# Patient Record
Sex: FEMALE | Race: WHITE | NOT HISPANIC OR LATINO | Employment: OTHER | URBAN - METROPOLITAN AREA
[De-identification: names, ages, dates, MRNs, and addresses within clinical notes are randomized per-mention and may not be internally consistent; named-entity substitution may affect disease eponyms.]

---

## 2017-01-03 ENCOUNTER — APPOINTMENT (OUTPATIENT)
Dept: RADIOLOGY | Facility: MEDICAL CENTER | Age: 57
End: 2017-01-03
Attending: PHYSICIAN ASSISTANT
Payer: MEDICARE

## 2017-01-17 ENCOUNTER — OFFICE VISIT (OUTPATIENT)
Dept: MEDICAL GROUP | Facility: MEDICAL CENTER | Age: 57
End: 2017-01-17
Payer: COMMERCIAL

## 2017-01-17 VITALS
BODY MASS INDEX: 26.5 KG/M2 | WEIGHT: 135 LBS | SYSTOLIC BLOOD PRESSURE: 130 MMHG | RESPIRATION RATE: 16 BRPM | TEMPERATURE: 97.2 F | HEART RATE: 86 BPM | HEIGHT: 60 IN | DIASTOLIC BLOOD PRESSURE: 84 MMHG | OXYGEN SATURATION: 94 %

## 2017-01-17 DIAGNOSIS — E11.29 TYPE 2 DIABETES MELLITUS WITH MICROALBUMINURIA, WITHOUT LONG-TERM CURRENT USE OF INSULIN (HCC): ICD-10-CM

## 2017-01-17 DIAGNOSIS — F41.9 ANXIETY AND DEPRESSION: ICD-10-CM

## 2017-01-17 DIAGNOSIS — E78.5 HYPERLIPIDEMIA, UNSPECIFIED HYPERLIPIDEMIA TYPE: ICD-10-CM

## 2017-01-17 DIAGNOSIS — G89.29 OTHER CHRONIC PAIN: ICD-10-CM

## 2017-01-17 DIAGNOSIS — R80.9 MICROALBUMINURIA: ICD-10-CM

## 2017-01-17 DIAGNOSIS — I10 ESSENTIAL HYPERTENSION: ICD-10-CM

## 2017-01-17 DIAGNOSIS — R80.9 TYPE 2 DIABETES MELLITUS WITH MICROALBUMINURIA, WITHOUT LONG-TERM CURRENT USE OF INSULIN (HCC): ICD-10-CM

## 2017-01-17 DIAGNOSIS — F32.A ANXIETY AND DEPRESSION: ICD-10-CM

## 2017-01-17 DIAGNOSIS — Z72.0 TOBACCO USE: ICD-10-CM

## 2017-01-17 PROCEDURE — 99214 OFFICE O/P EST MOD 30 MIN: CPT | Performed by: PHYSICIAN ASSISTANT

## 2017-01-17 NOTE — ASSESSMENT & PLAN NOTE
Chronic. Stable. Currently taking Celexa 40 mg daily basis. Currently homicidal or suicidal ideations. Denies auditory, visual or tactile hallucinations.

## 2017-01-17 NOTE — ASSESSMENT & PLAN NOTE
Chronic in nature. Currently taking Lipitor 40 mg daily basis. Car seats no abdominal pain. Denies any muscle aches, myalgias. Patient states no change in stool color, consistency.

## 2017-01-17 NOTE — ASSESSMENT & PLAN NOTE
Chronic in nature. Currently taking metformin 500 mg twice a day. Patient compliantly taking medications. Checking sugars approximately 3 times per week. Patient states sugars run approximately 110-112 when checked. Review of medical records shows labs out of date, ordered accordingly today. Patient does mention that diet, exercise could be better.

## 2017-01-17 NOTE — ASSESSMENT & PLAN NOTE
Currently being monitored by our office. Patient is aware that if levels are increasing in showing worsening state we will refer to nephrology

## 2017-01-17 NOTE — ASSESSMENT & PLAN NOTE
Chronic. Stable. Currently taking losartan and carvedilol. Was following up with cardiology. Currently asymptomatic and states no chest pain, shortness of breath, jaw claudication, diaphoresis, nausea, vomiting

## 2017-01-17 NOTE — PROGRESS NOTES
Chief Complaint   Patient presents with   • Follow-Up       HISTORY OF PRESENT ILLNESS: Patient is a 57 y.o. female established patient who presents today to follow up on a number chronic conditions    Type 2 diabetes mellitus with microalbuminuria, without long-term current use of insulin (CMS-[facility])  Chronic in nature. Currently taking metformin 500 mg twice a day. Patient compliantly taking medications. Checking sugars approximately 3 times per week. Patient states sugars run approximately 110-112 when checked. Review of medical records shows labs out of date, ordered accordingly today. Patient does mention that diet, exercise could be better.    Tobacco use  Patient states she is down to half a pack of cigarettes per day. Patient is fully aware of the health concerns regarding tobacco use    Other chronic pain  Chronic in nature. Currently following up with pain management. Stable.    Microalbuminuria  Currently being monitored by our office. Patient is aware that if levels are increasing in showing worsening state we will refer to nephrology    Hypertension  Chronic. Stable. Currently taking losartan and carvedilol. Was following up with cardiology. Currently asymptomatic and states no chest pain, shortness of breath, jaw claudication, diaphoresis, nausea, vomiting    Hyperlipidemia  Chronic in nature. Currently taking Lipitor 40 mg daily basis. Car seats no abdominal pain. Denies any muscle aches, myalgias. Patient states no change in stool color, consistency.    Anxiety and depression  Chronic. Stable. Currently taking Celexa 40 mg daily basis. Currently homicidal or suicidal ideations. Denies auditory, visual or tactile hallucinations.    Patient Active Problem List    Diagnosis Date Noted   • Hospital discharge follow-up 08/16/2016   • Other chronic pain 07/15/2015   • Abnormal lung sounds 04/06/2015   • Hypertension 10/01/2014   • Type 2 diabetes mellitus without complication (CMS-[facility]) 10/01/2014   • Anxiety  and depression 10/01/2014   • Hyperlipidemia 10/01/2014   • Microalbuminuria 10/01/2014   • Type 2 diabetes mellitus with microalbuminuria, without long-term current use of insulin (CMS-[facility]) 10/01/2014   • Tobacco use 10/01/2014     Allergies:Nkda    Current Outpatient Prescriptions   Medication Sig Dispense Refill   • alprazolam (XANAX) 0.5 MG Tab i po qday prn anxiety 30 Tab 2   • fluticasone (FLONASE) 50 MCG/ACT nasal spray PLACE ONE SPRAY IN EACH NOSTRIL TWICE DAILY 1 Bottle 1   • citalopram (CELEXA) 40 MG Tab Take 1 Tab by mouth every day. 30 Tab 2   • phenazopyridine (PYRIDIUM) 200 MG Tab Take 1 Tab by mouth 3 times a day as needed. 6 Tab 0   • nitrofurantoin monohydr macro (MACROBID) 100 MG Cap Take 1 Cap by mouth 2 times a day. 20 Cap 0   • oxycodone (OXY-IR) 15 MG immediate release tablet One 15 mg, po, take five times per day 60 Tab 0   • zolpidem (AMBIEN) 5 MG Tab TAKE 1 TABLET BY MOUTH EVERY NIGHT AT BEDTIME AS NEEDED FOR SLEEP 30 Tab 5   • metformin (GLUCOPHAGE) 500 MG Tab TAKE 1 TABLET BY MOUTH 2 TIMES A DAY, WITH MEALS. 60 Tab 5   • losartan (COZAAR) 100 MG Tab TAKE 1 TABLET BY MOUTH EVERY DAY FOR BLOOD PRESSURE 30 Tab 5   • atorvastatin (LIPITOR) 40 MG Tab TAKE 1 TABLET BY MOUTH EVERY DAY 30 Tab 5   • carvedilol (COREG) 12.5 MG Tab TAKE 1 TAB BY MOUTH 2 TIMES A DAY, WITH MEALS. 60 Tab 5   • hydrochlorothiazide (MICROZIDE) 12.5 MG capsule TAKE ONE CAPSULE BY MOUTH DAILY 30 Cap 2   • niacin (SLO-NIACIN) 500 MG tablet Take 1 Tab by mouth 2 times a day. 60 Tab 3     No current facility-administered medications for this visit.     Social History   Substance Use Topics   • Smoking status: Current Every Day Smoker -- 30 years     Types: Cigarettes   • Smokeless tobacco: Never Used      Comment: 1/2 pack a day   • Alcohol Use: No     No family status information on file.   No family history on file.    Review of Systems:   Constitutional: Negative for fever, chills, weight loss and malaise/fatigue.   HENT:  Negative for ear pain, nosebleeds, congestion, sore throat and neck pain.    Eyes: Negative for blurred vision.   Respiratory: Negative for cough, sputum production, shortness of breath and wheezing.    Cardiovascular: Negative for chest pain, palpitations, orthopnea and leg swelling.   Gastrointestinal: Negative for heartburn, nausea, vomiting and abdominal pain.   Genitourinary: Negative for dysuria, urgency and frequency.   Musculoskeletal: Negative for myalgias, back pain and joint pain.   Skin: Negative for rash and itching.   Neurological: Negative for dizziness, tingling, tremors, sensory change, focal weakness and headaches.   Endo/Heme/Allergies: Does not bruise/bleed easily.   Psychiatric/Behavioral: Negative for depression, suicidal ideas and memory loss.  The patient is not nervous/anxious and does not have insomnia.    All other systems reviewed and are negative except as in HPI.    Exam:  Blood pressure 130/84, pulse 86, temperature 36.2 °C (97.2 °F), resp. rate 16, height 1.524 m (5'), weight 61.236 kg (135 lb), SpO2 94 %, not currently breastfeeding.  General:  Well nourished, well developed female in NAD  Head: is grossly normal.  Neck: Supple without JVD or bruit. Thyroid is not enlarged.  Pulmonary: Clear to ausculation. Normal effort. No rales, ronchi, or wheezing.  Cardiovascular: Regular rate and rhythm without murmur. Carotid and radial pulses are intact and equal bilaterally.  Extremities: no clubbing, cyanosis, or edema.    Please note that this dictation was created using voice recognition software. I have made every reasonable attempt to correct obvious errors, but I expect that there are errors of grammar and possibly content that I did not discover before finalizing the note.    Assessment/Plan:  1. Tobacco use     2. Other chronic pain     3. Microalbuminuria  CBC WITH DIFFERENTIAL    LIPID PROFILE    COMP METABOLIC PANEL    TSH    URINALYSIS,CULTURE IF INDICATED    HEMOGLOBIN A1C     MICROALBUMIN CREAT RATIO URINE   4. Essential hypertension  CBC WITH DIFFERENTIAL    LIPID PROFILE    COMP METABOLIC PANEL    TSH    URINALYSIS,CULTURE IF INDICATED    HEMOGLOBIN A1C   5. Hyperlipidemia, unspecified hyperlipidemia type  CBC WITH DIFFERENTIAL    LIPID PROFILE    COMP METABOLIC PANEL    TSH    URINALYSIS,CULTURE IF INDICATED    HEMOGLOBIN A1C   6. Anxiety and depression  CBC WITH DIFFERENTIAL    LIPID PROFILE    COMP METABOLIC PANEL    TSH    URINALYSIS,CULTURE IF INDICATED   7. Type 2 diabetes mellitus with microalbuminuria, without long-term current use of insulin (CMS-[facility])  CBC WITH DIFFERENTIAL    LIPID PROFILE    COMP METABOLIC PANEL    TSH    URINALYSIS,CULTURE IF INDICATED    HEMOGLOBIN A1C    MICROALBUMIN CREAT RATIO URINE

## 2017-01-17 NOTE — ASSESSMENT & PLAN NOTE
Patient states she is down to half a pack of cigarettes per day. Patient is fully aware of the health concerns regarding tobacco use

## 2017-03-10 NOTE — TELEPHONE ENCOUNTER
Was the patient seen in the last year in this department? Yes     Does patient have an active prescription for medications requested? No     Received Request Via: Pharmacy       Last seen 01/17/2017  Labs 07/13/2016

## 2017-03-13 RX ORDER — ALPRAZOLAM 0.5 MG/1
TABLET ORAL
Qty: 30 TAB | Refills: 2 | Status: SHIPPED | OUTPATIENT
Start: 2017-03-13 | End: 2017-06-03 | Stop reason: SDUPTHER

## 2017-05-08 ENCOUNTER — HOSPITAL ENCOUNTER (OUTPATIENT)
Facility: MEDICAL CENTER | Age: 57
End: 2017-05-08
Attending: NEUROLOGICAL SURGERY | Admitting: NEUROLOGICAL SURGERY
Payer: COMMERCIAL

## 2017-06-05 RX ORDER — ALPRAZOLAM 0.5 MG/1
TABLET ORAL
Qty: 30 TAB | Refills: 2 | Status: SHIPPED | OUTPATIENT
Start: 2017-06-05

## 2017-06-05 NOTE — TELEPHONE ENCOUNTER
Please have patient schedule followup with primary care provider for any subsequent medication refills.  Patient needs labs

## 2017-06-05 NOTE — TELEPHONE ENCOUNTER
Was the patient seen in the last year in this department? Yes     Does patient have an active prescription for medications requested? Yes     Received Request Via: Pharmacy     Last office visit: 01/17/2017  Last labs: 07/13/2016

## 2017-06-07 DIAGNOSIS — J30.2 SEASONAL ALLERGIC RHINITIS, UNSPECIFIED ALLERGIC RHINITIS TRIGGER: ICD-10-CM

## 2017-06-07 RX ORDER — FLUTICASONE PROPIONATE 50 MCG
SPRAY, SUSPENSION (ML) NASAL
Qty: 1 BOTTLE | Refills: 3 | Status: SHIPPED | OUTPATIENT
Start: 2017-06-07 | End: 2021-05-06

## 2021-04-14 ENCOUNTER — TELEPHONE (OUTPATIENT)
Dept: SCHEDULING | Facility: IMAGING CENTER | Age: 61
End: 2021-04-14

## 2021-04-15 ENCOUNTER — OFFICE VISIT (OUTPATIENT)
Dept: MEDICAL GROUP | Facility: PHYSICIAN GROUP | Age: 61
End: 2021-04-15
Payer: COMMERCIAL

## 2021-04-15 VITALS
BODY MASS INDEX: 31.37 KG/M2 | TEMPERATURE: 97.5 F | HEIGHT: 60 IN | SYSTOLIC BLOOD PRESSURE: 160 MMHG | DIASTOLIC BLOOD PRESSURE: 80 MMHG | WEIGHT: 159.8 LBS | RESPIRATION RATE: 18 BRPM | HEART RATE: 78 BPM | OXYGEN SATURATION: 92 %

## 2021-04-15 DIAGNOSIS — Z12.11 SCREEN FOR COLON CANCER: ICD-10-CM

## 2021-04-15 DIAGNOSIS — I10 ESSENTIAL HYPERTENSION: ICD-10-CM

## 2021-04-15 DIAGNOSIS — Z00.00 ANNUAL PHYSICAL EXAM: ICD-10-CM

## 2021-04-15 DIAGNOSIS — R80.9 TYPE 2 DIABETES MELLITUS WITH MICROALBUMINURIA, WITHOUT LONG-TERM CURRENT USE OF INSULIN (HCC): ICD-10-CM

## 2021-04-15 DIAGNOSIS — Z12.31 ENCOUNTER FOR SCREENING MAMMOGRAM FOR BREAST CANCER: ICD-10-CM

## 2021-04-15 DIAGNOSIS — E11.29 TYPE 2 DIABETES MELLITUS WITH MICROALBUMINURIA, WITHOUT LONG-TERM CURRENT USE OF INSULIN (HCC): ICD-10-CM

## 2021-04-15 DIAGNOSIS — E11.9 TYPE 2 DIABETES MELLITUS WITHOUT COMPLICATION, WITHOUT LONG-TERM CURRENT USE OF INSULIN (HCC): ICD-10-CM

## 2021-04-15 PROCEDURE — 99214 OFFICE O/P EST MOD 30 MIN: CPT | Performed by: NURSE PRACTITIONER

## 2021-04-15 RX ORDER — BUPRENORPHINE HYDROCHLORIDE AND NALOXONE HYDROCHLORIDE DIHYDRATE 8; 2 MG/1; MG/1
1 TABLET SUBLINGUAL DAILY
COMMUNITY

## 2021-04-15 RX ORDER — VENLAFAXINE HYDROCHLORIDE 150 MG/1
150 CAPSULE, EXTENDED RELEASE ORAL DAILY
COMMUNITY
Start: 2021-02-21 | End: 2022-12-05 | Stop reason: SDUPTHER

## 2021-04-15 RX ORDER — UBIDECARENONE 75 MG
100 CAPSULE ORAL DAILY
COMMUNITY

## 2021-04-15 RX ORDER — CHLORAL HYDRATE 500 MG
1000 CAPSULE ORAL
COMMUNITY

## 2021-04-15 RX ORDER — LOSARTAN POTASSIUM AND HYDROCHLOROTHIAZIDE 12.5; 1 MG/1; MG/1
1 TABLET ORAL DAILY
Qty: 30 TABLET | Refills: 1 | Status: SHIPPED | OUTPATIENT
Start: 2021-04-15 | End: 2021-05-06

## 2021-04-15 RX ORDER — MULTIVIT WITH MINERALS/LUTEIN
TABLET ORAL
COMMUNITY

## 2021-04-15 ASSESSMENT — PATIENT HEALTH QUESTIONNAIRE - PHQ9
4. FEELING TIRED OR HAVING LITTLE ENERGY: SEVERAL DAYS
5. POOR APPETITE OR OVEREATING: NOT AT ALL
2. FEELING DOWN, DEPRESSED, IRRITABLE, OR HOPELESS: NOT AT ALL
SUM OF ALL RESPONSES TO PHQ QUESTIONS 1-9: 4
8. MOVING OR SPEAKING SO SLOWLY THAT OTHER PEOPLE COULD HAVE NOTICED. OR THE OPPOSITE, BEING SO FIGETY OR RESTLESS THAT YOU HAVE BEEN MOVING AROUND A LOT MORE THAN USUAL: NOT AT ALL
1. LITTLE INTEREST OR PLEASURE IN DOING THINGS: NOT AT ALL
SUM OF ALL RESPONSES TO PHQ9 QUESTIONS 1 AND 2: 0
7. TROUBLE CONCENTRATING ON THINGS, SUCH AS READING THE NEWSPAPER OR WATCHING TELEVISION: NOT AT ALL
6. FEELING BAD ABOUT YOURSELF - OR THAT YOU ARE A FAILURE OR HAVE LET YOURSELF OR YOUR FAMILY DOWN: NOT AL ALL
3. TROUBLE FALLING OR STAYING ASLEEP OR SLEEPING TOO MUCH: NEARLY EVERY DAY
9. THOUGHTS THAT YOU WOULD BE BETTER OFF DEAD, OR OF HURTING YOURSELF: NOT AT ALL

## 2021-04-15 NOTE — ASSESSMENT & PLAN NOTE
Chronic problem.  Patient reports this has not been monitored often, does not check sugars at home.  A1c from years ago.  Patient is taking Metformin 500 mg, recalls the latest A1c from a few years ago was under 7.  Patient is unable to tell if she has hypoglycemic episode  she does not check her sugars.  She denies neuropathy or changes in vision.  She does wear glasses.

## 2021-04-15 NOTE — ASSESSMENT & PLAN NOTE
New to me.  Chronic problem.  On Coreg and Cozaar.  Was also previously on hydrochlorothiazide, does not have a refill and has not been taking for 2 months.  BP today is 160/80 with a pulse of 78.  She denies CP, dyspnea, dizziness, HA, peripheral edema.

## 2021-04-15 NOTE — PROGRESS NOTES
Chief Complaint   Patient presents with   • Establish Care   • Referral Needed       HISTORY OF PRESENT ILLNESS: Patient is a 61 y.o. female, established patient who presents today to discuss medical problems as listed below:    Health Maintenance:  COMPLETED     Hypertension  New to me.  Chronic problem.  On Coreg and Cozaar.  Was also previously on hydrochlorothiazide, does not have a refill and has not been taking for 2 months.  BP today is 160/80 with a pulse of 78.  She denies CP, dyspnea, dizziness, HA, peripheral edema.    Type 2 diabetes mellitus without complication  Chronic problem.  Patient reports this has not been monitored often, does not check sugars at home.  A1c from years ago.  Patient is taking Metformin 500 mg, recalls the latest A1c from a few years ago was under 7.  Patient is unable to tell if she has hypoglycemic episode  she does not check her sugars.  She denies neuropathy or changes in vision.  She does wear glasses.      Patient Active Problem List    Diagnosis Date Noted   • Hospital discharge follow-up 08/16/2016   • Other chronic pain 07/15/2015   • Abnormal lung sounds 04/06/2015   • Hypertension 10/01/2014   • Type 2 diabetes mellitus without complication (HCC) 10/01/2014   • Anxiety and depression 10/01/2014   • Hyperlipidemia 10/01/2014   • Microalbuminuria 10/01/2014   • Type 2 diabetes mellitus with microalbuminuria, without long-term current use of insulin (HCC) 10/01/2014   • Tobacco use 10/01/2014        Allergies: Nkda [no known drug allergy]    Current Outpatient Medications   Medication Sig Dispense Refill   • venlafaxine (EFFEXOR-XR) 150 MG extended-release capsule Take 150 mg by mouth every day.     • buprenorphine-naloxone (SUBOXONE) 8-2 mg SL Tab SL Place 1 tablet under the tongue every day.     • Ascorbic Acid (VITAMIN C) 1000 MG Tab Take  by mouth.     • ASPIRIN 81 PO Take  by mouth.     • cyanocobalamin (VITAMIN B-12) 100 MCG Tab Take 100 mcg by mouth every day.      • Calcium-Magnesium-Vitamin D 185- MG-MG-UNIT Cap Take  by mouth.     • Omega-3 Fatty Acids (FISH OIL) 1000 MG Cap capsule Take 1,000 mg by mouth 3 times a day with meals.     • losartan-hydrochlorothiazide (HYZAAR) 100-12.5 MG per tablet Take 1 tablet by mouth every day. 30 tablet 1   • alprazolam (XANAX) 0.5 MG Tab TAKE ONE TABLET BY MOUTH DAILY AS NEEDED FOR ANXIETY 30 Tab 2   • metformin (GLUCOPHAGE) 500 MG Tab TAKE 1 TABLET BY MOUTH TWICE A DAY WITH FOOD 180 Tab 1   • carvedilol (COREG) 12.5 MG Tab TAKE 1 TABLET BY MOUTH TWICE A DAY WITH MEALS 180 Tab 01   • fluticasone (FLONASE) 50 MCG/ACT nasal spray INSTILL 1 SPRAY IN EACH NOSTRIL TWICE A DAY 1 Bottle 3     No current facility-administered medications for this visit.       Social History     Tobacco Use   • Smoking status: Current Some Day Smoker     Packs/day: 0.10     Years: 30.00     Pack years: 3.00     Types: Cigarettes   • Smokeless tobacco: Never Used   • Tobacco comment: 1/2 pack a day   Substance Use Topics   • Alcohol use: No     Alcohol/week: 0.0 oz   • Drug use: No     Social History     Social History Narrative   • Not on file       Family History   Problem Relation Age of Onset   • Cancer Father         colon ca   • Hyperlipidemia Father        Allergies, past medical history, past surgical history, family history, social history reviewed and updated.    Review of Systems:     - Constitutional: Negative for fever, chills, unexpected weight change, and fatigue/generalized weakness.     - HEENT: Negative for headaches, vision changes, hearing changes, ear pain, ear discharge, rhinorrhea, sinus congestion, sore throat, and neck pain.      - Respiratory: Negative for cough, sputum production, chest congestion, dyspnea, wheezing, and crackles.      - Cardiovascular: Negative for chest pain, palpitations, orthopnea, and bilateral lower extremity edema.     - Gastrointestinal: Negative for heartburn, nausea, vomiting, abdominal pain,  hematochezia, melena, diarrhea, constipation, and greasy/foul-smelling stools.     - Genitourinary: Negative for dysuria, polyuria, hematuria, pyuria, urinary urgency, and urinary incontinence.    - Musculoskeletal: Negative for myalgias, back pain, and joint pain.     - Skin: Negative for rash, itching, cyanotic skin color change.     - Neurological: Negative for dizziness, tingling, tremors, focal sensory deficit, focal weakness and headaches.     - Endo/Heme/Allergies: Does not bruise/bleed easily.     - Psychiatric/Behavioral: Negative for depression, suicidal/homicidal ideation and memory loss.      All other systems reviewed and are negative    Exam:    /80   Pulse 78   Temp 36.4 °C (97.5 °F) (Temporal)   Resp 18   Ht 1.524 m (5')   Wt 72.5 kg (159 lb 12.8 oz)   SpO2 92%   BMI 31.21 kg/m²  Body mass index is 31.21 kg/m².    Physical Exam:  Constitutional: Well-developed and well-nourished. Not diaphoretic. No distress.   Skin: Skin is warm and dry. No rash noted.  Head: Atraumatic without lesions.  Eyes: Conjunctivae and extraocular motions are normal. Pupils are equal, round, and reactive to light. No scleral icterus.   Ears:  External ears unremarkable. Tympanic membranes clear and intact.  Nose: Nares patent. Septum midline. Turbinates without erythema nor edema. No discharge.   Mouth/Throat: Dentition is normal. Tongue normal. Oropharynx is clear and moist. Posterior pharynx without erythema or exudates.  Neck: Supple, trachea midline. Normal range of motion. No thyromegaly present. No lymphadenopathy--cervical or supraclavicular.  Cardiovascular: Regular rate and rhythm, S1 and S2 without murmur, rubs, or gallops.    Chest: Effort normal. Clear to auscultation throughout. No adventitious sounds. No CVA tenderness.  Abdomen: Soft, non tender, and without distention. Active bowel sounds in all four quadrants. No rebound, guarding, masses or HSM.  : Negative for dysuria, polyuria, hematuria,  pyuria, urinary urgency, and urinary incontinence.  Extremities: No cyanosis, clubbing, erythema, nor edema. Distal pulses intact and symmetric.   Musculoskeletal: All major joints AROM full in all directions without pain.  Neurological: Alert and oriented x 3. DTRs 2+/3 and symmetric. No cranial nerve deficit. 5/5 myotomes. Sensation intact. Negative Rhomberg.  Psychiatric:  Behavior, mood, and affect are appropriate.  MA/nursing note and vitals reviewed.    LABS: 2014  results reviewed and discussed with the patient, questions answered.    Assessment/Plan:  1. Annual physical exam  - CBC WITHOUT DIFFERENTIAL; Future  - Comp Metabolic Panel; Future  - TSH; Future  - FREE THYROXINE; Future  - HEMOGLOBIN A1C; Future  - Lipid Profile; Future  - VITAMIN D,25 HYDROXY; Future    2. Essential hypertension  Uncontrolled.  Trial of combo Hyzaar 100-12.5 mg.  Advised to start BP log and monitor for 5 to 7 days.  Will review next visit.  - losartan-hydrochlorothiazide (HYZAAR) 100-12.5 MG per tablet; Take 1 tablet by mouth every day.  Dispense: 30 tablet; Refill: 1    3. Type 2 diabetes mellitus with microalbuminuria, without long-term current use of insulin (HCC)  We will review labs and discuss findings with patient.  - REFERRAL FOR RETINAL SCREENING EXAM  - Microalbumin Creat Ratio Urine (Lab Collect); Future  - REFERRAL TO OPHTHALMOLOGY    4. Encounter for screening mammogram for breast cancer  - MA-SCREENING MAMMO BILAT W/TOMOSYNTHESIS W/CAD; Future    5. Type 2 diabetes mellitus without complication, without long-term current use of insulin (HCC)    6. Screen for colon cancer  - OCCULT BLOOD,FECAL,IMMUNOASSAY       Discussed with patient possible alternative diagnoses, pt is to take all medications as prescribed. If symptoms persist FU w/PCP, if symptoms worsen go to emergency room. If experiencing any side effects from prescribed medications reports to the office immediately or go to emergency room.  Reviewed  indication, dosage, usage and potential adverse effects of prescribed medications. Reviewed risks and benefits of treatment plan. Patient verbalizes understanding of all instruction and verbally agrees to plan.    No follow-ups on file. annual

## 2021-04-29 ENCOUNTER — APPOINTMENT (OUTPATIENT)
Dept: MEDICAL GROUP | Facility: PHYSICIAN GROUP | Age: 61
End: 2021-04-29
Payer: COMMERCIAL

## 2021-04-30 ENCOUNTER — HOSPITAL ENCOUNTER (OUTPATIENT)
Dept: LAB | Facility: MEDICAL CENTER | Age: 61
End: 2021-04-30
Attending: NURSE PRACTITIONER
Payer: COMMERCIAL

## 2021-04-30 DIAGNOSIS — R80.9 TYPE 2 DIABETES MELLITUS WITH MICROALBUMINURIA, WITHOUT LONG-TERM CURRENT USE OF INSULIN (HCC): ICD-10-CM

## 2021-04-30 DIAGNOSIS — E11.29 TYPE 2 DIABETES MELLITUS WITH MICROALBUMINURIA, WITHOUT LONG-TERM CURRENT USE OF INSULIN (HCC): ICD-10-CM

## 2021-04-30 DIAGNOSIS — Z00.00 ANNUAL PHYSICAL EXAM: ICD-10-CM

## 2021-04-30 PROCEDURE — 84439 ASSAY OF FREE THYROXINE: CPT

## 2021-04-30 PROCEDURE — 36415 COLL VENOUS BLD VENIPUNCTURE: CPT

## 2021-04-30 PROCEDURE — 80053 COMPREHEN METABOLIC PANEL: CPT

## 2021-04-30 PROCEDURE — 84443 ASSAY THYROID STIM HORMONE: CPT

## 2021-04-30 PROCEDURE — 80061 LIPID PANEL: CPT

## 2021-04-30 PROCEDURE — 82570 ASSAY OF URINE CREATININE: CPT

## 2021-04-30 PROCEDURE — 82306 VITAMIN D 25 HYDROXY: CPT

## 2021-04-30 PROCEDURE — 82043 UR ALBUMIN QUANTITATIVE: CPT

## 2021-04-30 PROCEDURE — 85027 COMPLETE CBC AUTOMATED: CPT

## 2021-04-30 PROCEDURE — 83036 HEMOGLOBIN GLYCOSYLATED A1C: CPT

## 2021-04-30 RX ORDER — CARVEDILOL 25 MG/1
25 TABLET ORAL 2 TIMES DAILY
COMMUNITY
Start: 2021-02-16 | End: 2021-05-06

## 2021-04-30 RX ORDER — BUPRENORPHINE AND NALOXONE 8; 2 MG/1; MG/1
FILM, SOLUBLE BUCCAL; SUBLINGUAL
COMMUNITY
Start: 2021-04-12 | End: 2021-05-06

## 2021-05-01 LAB
ALBUMIN SERPL BCP-MCNC: 4.2 G/DL (ref 3.2–4.9)
ALBUMIN/GLOB SERPL: 1.3 G/DL
ALP SERPL-CCNC: 77 U/L (ref 30–99)
ALT SERPL-CCNC: 23 U/L (ref 2–50)
ANION GAP SERPL CALC-SCNC: 9 MMOL/L (ref 7–16)
AST SERPL-CCNC: 20 U/L (ref 12–45)
BILIRUB SERPL-MCNC: 0.4 MG/DL (ref 0.1–1.5)
BUN SERPL-MCNC: 23 MG/DL (ref 8–22)
CALCIUM SERPL-MCNC: 10 MG/DL (ref 8.5–10.5)
CHLORIDE SERPL-SCNC: 99 MMOL/L (ref 96–112)
CHOLEST SERPL-MCNC: 195 MG/DL (ref 100–199)
CO2 SERPL-SCNC: 28 MMOL/L (ref 20–33)
CREAT SERPL-MCNC: 0.77 MG/DL (ref 0.5–1.4)
CREAT UR-MCNC: 110.63 MG/DL
ERYTHROCYTE [DISTWIDTH] IN BLOOD BY AUTOMATED COUNT: 42.8 FL (ref 35.9–50)
EST. AVERAGE GLUCOSE BLD GHB EST-MCNC: 143 MG/DL
FASTING STATUS PATIENT QL REPORTED: NORMAL
GLOBULIN SER CALC-MCNC: 3.2 G/DL (ref 1.9–3.5)
GLUCOSE SERPL-MCNC: 93 MG/DL (ref 65–99)
HBA1C MFR BLD: 6.6 % (ref 4–5.6)
HCT VFR BLD AUTO: 43.1 % (ref 37–47)
HDLC SERPL-MCNC: 36 MG/DL
HGB BLD-MCNC: 13.8 G/DL (ref 12–16)
LDLC SERPL CALC-MCNC: ABNORMAL MG/DL
MCH RBC QN AUTO: 29.5 PG (ref 27–33)
MCHC RBC AUTO-ENTMCNC: 32 G/DL (ref 33.6–35)
MCV RBC AUTO: 92.1 FL (ref 81.4–97.8)
MICROALBUMIN UR-MCNC: 9.7 MG/DL
MICROALBUMIN/CREAT UR: 88 MG/G (ref 0–30)
PLATELET # BLD AUTO: 263 K/UL (ref 164–446)
PMV BLD AUTO: 10.6 FL (ref 9–12.9)
POTASSIUM SERPL-SCNC: 4.1 MMOL/L (ref 3.6–5.5)
PROT SERPL-MCNC: 7.4 G/DL (ref 6–8.2)
RBC # BLD AUTO: 4.68 M/UL (ref 4.2–5.4)
SODIUM SERPL-SCNC: 136 MMOL/L (ref 135–145)
T4 FREE SERPL-MCNC: 1.04 NG/DL (ref 0.93–1.7)
TRIGL SERPL-MCNC: 426 MG/DL (ref 0–149)
TSH SERPL DL<=0.005 MIU/L-ACNC: 3.12 UIU/ML (ref 0.38–5.33)
WBC # BLD AUTO: 8.8 K/UL (ref 4.8–10.8)

## 2021-05-04 LAB — 25(OH)D3 SERPL-MCNC: 43 NG/ML (ref 30–80)

## 2021-05-06 ENCOUNTER — OFFICE VISIT (OUTPATIENT)
Dept: MEDICAL GROUP | Facility: PHYSICIAN GROUP | Age: 61
End: 2021-05-06
Payer: COMMERCIAL

## 2021-05-06 VITALS
TEMPERATURE: 97.5 F | WEIGHT: 157 LBS | SYSTOLIC BLOOD PRESSURE: 140 MMHG | DIASTOLIC BLOOD PRESSURE: 75 MMHG | RESPIRATION RATE: 12 BRPM | HEART RATE: 71 BPM | HEIGHT: 60 IN | OXYGEN SATURATION: 94 % | BODY MASS INDEX: 30.82 KG/M2

## 2021-05-06 DIAGNOSIS — E78.5 HYPERLIPIDEMIA, UNSPECIFIED HYPERLIPIDEMIA TYPE: ICD-10-CM

## 2021-05-06 DIAGNOSIS — L84 CALLUS: ICD-10-CM

## 2021-05-06 DIAGNOSIS — I10 ESSENTIAL HYPERTENSION: ICD-10-CM

## 2021-05-06 DIAGNOSIS — E11.9 TYPE 2 DIABETES MELLITUS WITHOUT COMPLICATION, WITHOUT LONG-TERM CURRENT USE OF INSULIN (HCC): ICD-10-CM

## 2021-05-06 PROCEDURE — 99214 OFFICE O/P EST MOD 30 MIN: CPT | Performed by: NURSE PRACTITIONER

## 2021-05-06 RX ORDER — LOSARTAN POTASSIUM AND HYDROCHLOROTHIAZIDE 25; 100 MG/1; MG/1
1 TABLET ORAL DAILY
Qty: 901 TABLET | Refills: 1 | Status: SHIPPED | OUTPATIENT
Start: 2021-05-06 | End: 2022-10-26 | Stop reason: SDUPTHER

## 2021-05-06 RX ORDER — ATORVASTATIN CALCIUM 40 MG/1
40 TABLET, FILM COATED ORAL
Qty: 90 TABLET | Refills: 3 | Status: SHIPPED | OUTPATIENT
Start: 2021-05-06 | End: 2022-10-26 | Stop reason: SDUPTHER

## 2021-05-06 ASSESSMENT — FIBROSIS 4 INDEX: FIB4 SCORE: 0.98

## 2021-05-06 NOTE — ASSESSMENT & PLAN NOTE
Chronic problem.  Recent A1c is 6.6.  She is on Metformin 500 mg daily.  Her CMP is WNL.  Denies neuropathy.  Patient has not had ophthalmology appointment yet.

## 2021-05-06 NOTE — ASSESSMENT & PLAN NOTE
Results for SHARLA BEST (MRN [id number]) as of 5/6/2021 13:52   Ref. Range 4/30/2021 15:04   Cholesterol,Tot Latest Ref Range: 100 - 199 mg/dL 195   Triglycerides Latest Ref Range: 0 - 149 mg/dL 426 (H)   HDL Latest Ref Range: >=40 mg/dL 36 (A)   LDL Latest Ref Range: <100 mg/dL see below   The 10-year ASCVD risk score (Stanleymallorie MATUTE Jr., et al., 2013) is: 28.1%    Values used to calculate the score:      Age: 61 years      Sex: Female      Is Non- : No      Diabetic: Yes      Tobacco smoker: Yes      Systolic Blood Pressure: 140 mmHg      Is BP treated: Yes      HDL Cholesterol: 36 mg/dL      Total Cholesterol: 195 mg/dL     Current everyday smoker, working on cessation.  Was previously on Lipitor, tolerated well.  She denies CP, dyspnea, dizziness or peripheral edema.

## 2021-05-06 NOTE — ASSESSMENT & PLAN NOTE
Chronic problem.  On Coreg 12.5 mg and Hyzaar was adjusted to 100-12.5 mg last visit which improved BP.  BP today is 140/75 with a pulse of 71.  Previous data is 160/80 with pulse of 78.  Labs with reassuring CMP.

## 2021-05-06 NOTE — PROGRESS NOTES
Chief Complaint   Patient presents with   • Lab Results       HISTORY OF PRESENT ILLNESS: Patient is a 61 y.o. female, established patient who presents today to discuss medical problems as listed below:    Health Maintenance:  COMPLETED     Hyperlipidemia  Results for SHARLA BEST (MRN [id number]) as of 5/6/2021 13:52   Ref. Range 4/30/2021 15:04   Cholesterol,Tot Latest Ref Range: 100 - 199 mg/dL 195   Triglycerides Latest Ref Range: 0 - 149 mg/dL 426 (H)   HDL Latest Ref Range: >=40 mg/dL 36 (A)   LDL Latest Ref Range: <100 mg/dL see below   The 10-year ASCVD risk score (Stanley MATUTE Jr., et al., 2013) is: 28.1%    Values used to calculate the score:      Age: 61 years      Sex: Female      Is Non- : No      Diabetic: Yes      Tobacco smoker: Yes      Systolic Blood Pressure: 140 mmHg      Is BP treated: Yes      HDL Cholesterol: 36 mg/dL      Total Cholesterol: 195 mg/dL     Current everyday smoker, working on cessation.  Was previously on Lipitor, tolerated well.  She denies CP, dyspnea, dizziness or peripheral edema.    Type 2 diabetes mellitus without complication  Chronic problem.  Recent A1c is 6.6.  She is on Metformin 500 mg daily.  Her CMP is WNL.  Denies neuropathy.  Patient has not had ophthalmology appointment yet.    Hypertension  Chronic problem.  On Coreg 12.5 mg and Hyzaar was adjusted to 100-12.5 mg last visit which improved BP.  BP today is 140/75 with a pulse of 71.  Previous data is 160/80 with pulse of 78.  Labs with reassuring CMP.      Patient Active Problem List    Diagnosis Date Noted   • Hospital discharge follow-up 08/16/2016   • Other chronic pain 07/15/2015   • Abnormal lung sounds 04/06/2015   • Hypertension 10/01/2014   • Type 2 diabetes mellitus without complication (HCC) 10/01/2014   • Anxiety and depression 10/01/2014   • Hyperlipidemia 10/01/2014   • Microalbuminuria 10/01/2014   • Type 2 diabetes mellitus with microalbuminuria, without long-term current use of  insulin (HCC) 10/01/2014   • Tobacco use 10/01/2014        Allergies: Nkda [no known drug allergy]    Current Outpatient Medications   Medication Sig Dispense Refill   • atorvastatin (LIPITOR) 40 MG Tab Take 1 tablet by mouth at bedtime. 90 tablet 3   • losartan-hydrochlorothiazide (HYZAAR) 100-25 MG per tablet Take 1 tablet by mouth every day. 901 tablet 1   • venlafaxine (EFFEXOR-XR) 150 MG extended-release capsule Take 150 mg by mouth every day.     • buprenorphine-naloxone (SUBOXONE) 8-2 mg SL Tab SL Place 1 tablet under the tongue every day.     • Ascorbic Acid (VITAMIN C) 1000 MG Tab Take  by mouth.     • ASPIRIN 81 PO Take  by mouth.     • cyanocobalamin (VITAMIN B-12) 100 MCG Tab Take 100 mcg by mouth every day.     • Calcium-Magnesium-Vitamin D 185- MG-MG-UNIT Cap Take  by mouth.     • Omega-3 Fatty Acids (FISH OIL) 1000 MG Cap capsule Take 1,000 mg by mouth 3 times a day with meals.     • alprazolam (XANAX) 0.5 MG Tab TAKE ONE TABLET BY MOUTH DAILY AS NEEDED FOR ANXIETY 30 Tab 2   • metformin (GLUCOPHAGE) 500 MG Tab TAKE 1 TABLET BY MOUTH TWICE A DAY WITH FOOD 180 Tab 1   • carvedilol (COREG) 12.5 MG Tab TAKE 1 TABLET BY MOUTH TWICE A DAY WITH MEALS 180 Tab 01     No current facility-administered medications for this visit.       Social History     Tobacco Use   • Smoking status: Current Some Day Smoker     Packs/day: 0.10     Years: 30.00     Pack years: 3.00     Types: Cigarettes   • Smokeless tobacco: Never Used   • Tobacco comment: 1/2 pack a day   Substance Use Topics   • Alcohol use: No     Alcohol/week: 0.0 oz   • Drug use: No     Social History     Social History Narrative   • Not on file       Family History   Problem Relation Age of Onset   • Cancer Father         colon ca   • Hyperlipidemia Father        Allergies, past medical history, past surgical history, family history, social history reviewed and updated.    Review of Systems:     - Constitutional: Negative for fever, chills,  unexpected weight change, and fatigue/generalized weakness.     - Respiratory: Negative for cough, sputum production, chest congestion, dyspnea, wheezing, and crackles.      - Cardiovascular: Negative for chest pain, palpitations, orthopnea, and bilateral lower extremity edema.     - Psychiatric/Behavioral: Negative for depression, suicidal/homicidal ideation and memory loss.      All other systems reviewed and are negative    Exam:    /75   Pulse 71   Temp 36.4 °C (97.5 °F) (Temporal)   Resp 12   Ht 1.524 m (5')   Wt 71.2 kg (157 lb)   SpO2 94%   BMI 30.66 kg/m²  Body mass index is 30.66 kg/m².    Physical Exam:  Constitutional: Well-developed and well-nourished. Not diaphoretic. No distress.   Cardiovascular: Regular rate and rhythm, S1 and S2 without murmur, rubs, or gallops.    Chest: Effort normal. Clear to auscultation throughout. No adventitious sounds. Neurological: Alert and oriented x 3.   Psychiatric:  Behavior, mood, and affect are appropriate.  MA/nursing note and vitals reviewed.    LABS: 4/2021  results reviewed and discussed with the patient, questions answered.    My total time spent caring for the patient on the day of the encounter was 25 minutes.   This does not include time spent on separately billable procedures/tests.     Assessment/Plan:  1. Hyperlipidemia, unspecified hyperlipidemia type  Uncontrolled.  Discussed etiology and potential risk factors.  Will restart Lipitor.  Will recheck lipids in 3 months.  Discussed healthy lifestyle, advised low saturated fat diet and low carbohydrate diet, stay well-hydrated and daily exercise to preference and tolerance.  Follow-up next visit  - atorvastatin (LIPITOR) 40 MG Tab; Take 1 tablet by mouth at bedtime.  Dispense: 90 tablet; Refill: 3  - Lipid Profile; Future    2. Type 2 diabetes mellitus without complication, without long-term current use of insulin (HCC)  Stable on current regimen.  Continue.  Patient will follow up with  ophthalmologist and podiatry  - Diabetic Monofilament LE Exam    3. Essential hypertension  Uncontrolled, improving.  Will increase Hyzaar to 100-25 mg  - losartan-hydrochlorothiazide (HYZAAR) 100-25 MG per tablet; Take 1 tablet by mouth every day.  Dispense: 901 tablet; Refill: 1    4. Callus  - REFERRAL TO PODIATRY       Discussed with patient possible alternative diagnoses, pt is to take all medications as prescribed. If symptoms persist FU w/PCP, if symptoms worsen go to emergency room. If experiencing any side effects from prescribed medications reports to the office immediately or go to emergency room.  Reviewed indication, dosage, usage and potential adverse effects of prescribed medications. Reviewed risks and benefits of treatment plan. Patient verbalizes understanding of all instruction and verbally agrees to plan.    No follow-ups on file. every 3 mos

## 2021-05-27 ENCOUNTER — APPOINTMENT (OUTPATIENT)
Dept: MEDICAL GROUP | Facility: PHYSICIAN GROUP | Age: 61
End: 2021-05-27
Payer: COMMERCIAL

## 2021-11-10 ENCOUNTER — APPOINTMENT (RX ONLY)
Dept: URBAN - METROPOLITAN AREA CLINIC 31 | Facility: CLINIC | Age: 61
Setting detail: DERMATOLOGY
End: 2021-11-10

## 2021-11-10 DIAGNOSIS — L82.1 OTHER SEBORRHEIC KERATOSIS: ICD-10-CM

## 2021-11-10 DIAGNOSIS — L81.4 OTHER MELANIN HYPERPIGMENTATION: ICD-10-CM

## 2021-11-10 DIAGNOSIS — D22 MELANOCYTIC NEVI: ICD-10-CM

## 2021-11-10 DIAGNOSIS — L57.8 OTHER SKIN CHANGES DUE TO CHRONIC EXPOSURE TO NONIONIZING RADIATION: ICD-10-CM

## 2021-11-10 PROBLEM — D22.5 MELANOCYTIC NEVI OF TRUNK: Status: ACTIVE | Noted: 2021-11-10

## 2021-11-10 PROCEDURE — ? COUNSELING

## 2021-11-10 PROCEDURE — 99203 OFFICE O/P NEW LOW 30 MIN: CPT

## 2021-11-10 ASSESSMENT — LOCATION DETAILED DESCRIPTION DERM
LOCATION DETAILED: RIGHT INFERIOR CENTRAL MALAR CHEEK
LOCATION DETAILED: RIGHT PROXIMAL DORSAL FOREARM
LOCATION DETAILED: LEFT PROXIMAL DORSAL FOREARM
LOCATION DETAILED: LEFT DISTAL DORSAL FOREARM
LOCATION DETAILED: INFERIOR THORACIC SPINE
LOCATION DETAILED: RIGHT DISTAL DORSAL FOREARM

## 2021-11-10 ASSESSMENT — LOCATION SIMPLE DESCRIPTION DERM
LOCATION SIMPLE: LEFT FOREARM
LOCATION SIMPLE: RIGHT CHEEK
LOCATION SIMPLE: RIGHT FOREARM
LOCATION SIMPLE: UPPER BACK

## 2021-11-10 ASSESSMENT — LOCATION ZONE DERM
LOCATION ZONE: FACE
LOCATION ZONE: ARM
LOCATION ZONE: TRUNK

## 2021-11-10 NOTE — PROCEDURE: MIPS QUALITY
Quality 226: Preventive Care And Screening: Tobacco Use: Screening And Cessation Intervention: Patient screened for tobacco use, is a smoker AND received Cessation Counseling
Quality 130: Documentation Of Current Medications In The Medical Record: Current Medications Documented
Quality 431: Preventive Care And Screening: Unhealthy Alcohol Use - Screening: Patient not identified as an unhealthy alcohol user when screened for unhealthy alcohol use using a systematic screening method
Quality 110: Preventive Care And Screening: Influenza Immunization: Influenza Immunization not Administered for Documented Reasons.
Detail Level: Detailed

## 2022-07-22 ENCOUNTER — TELEPHONE (OUTPATIENT)
Dept: MEDICAL GROUP | Facility: PHYSICIAN GROUP | Age: 62
End: 2022-07-22

## 2022-07-22 ENCOUNTER — TELEPHONE (OUTPATIENT)
Dept: HEALTH INFORMATION MANAGEMENT | Facility: OTHER | Age: 62
End: 2022-07-22

## 2022-07-22 NOTE — TELEPHONE ENCOUNTER
Outcome: Left Message    Please transfer to Patient Outreach Team at [phone] when patient returns call.      Attempt # 1

## 2022-08-04 ENCOUNTER — TELEPHONE (OUTPATIENT)
Dept: SCHEDULING | Facility: IMAGING CENTER | Age: 62
End: 2022-08-04

## 2022-10-26 ENCOUNTER — HOSPITAL ENCOUNTER (OUTPATIENT)
Facility: MEDICAL CENTER | Age: 62
End: 2022-10-26
Attending: NURSE PRACTITIONER
Payer: COMMERCIAL

## 2022-10-26 ENCOUNTER — OFFICE VISIT (OUTPATIENT)
Dept: MEDICAL GROUP | Facility: PHYSICIAN GROUP | Age: 62
End: 2022-10-26
Payer: COMMERCIAL

## 2022-10-26 VITALS
SYSTOLIC BLOOD PRESSURE: 122 MMHG | DIASTOLIC BLOOD PRESSURE: 70 MMHG | BODY MASS INDEX: 28.74 KG/M2 | TEMPERATURE: 98.4 F | OXYGEN SATURATION: 93 % | WEIGHT: 146.4 LBS | HEART RATE: 71 BPM | RESPIRATION RATE: 16 BRPM | HEIGHT: 60 IN

## 2022-10-26 DIAGNOSIS — R80.9 TYPE 2 DIABETES MELLITUS WITH MICROALBUMINURIA, WITHOUT LONG-TERM CURRENT USE OF INSULIN (HCC): ICD-10-CM

## 2022-10-26 DIAGNOSIS — E78.5 HYPERLIPIDEMIA, UNSPECIFIED HYPERLIPIDEMIA TYPE: ICD-10-CM

## 2022-10-26 DIAGNOSIS — I10 PRIMARY HYPERTENSION: ICD-10-CM

## 2022-10-26 DIAGNOSIS — E11.9 TYPE 2 DIABETES MELLITUS WITHOUT COMPLICATION, WITHOUT LONG-TERM CURRENT USE OF INSULIN (HCC): ICD-10-CM

## 2022-10-26 DIAGNOSIS — E55.9 VITAMIN D DEFICIENCY: ICD-10-CM

## 2022-10-26 DIAGNOSIS — E11.29 TYPE 2 DIABETES MELLITUS WITH MICROALBUMINURIA, WITHOUT LONG-TERM CURRENT USE OF INSULIN (HCC): ICD-10-CM

## 2022-10-26 DIAGNOSIS — I10 ESSENTIAL HYPERTENSION: ICD-10-CM

## 2022-10-26 DIAGNOSIS — R10.9 FLANK PAIN: ICD-10-CM

## 2022-10-26 DIAGNOSIS — Z12.31 ENCOUNTER FOR SCREENING MAMMOGRAM FOR BREAST CANCER: ICD-10-CM

## 2022-10-26 DIAGNOSIS — Z23 NEED FOR VACCINATION: ICD-10-CM

## 2022-10-26 DIAGNOSIS — Z11.59 NEED FOR HEPATITIS C SCREENING TEST: ICD-10-CM

## 2022-10-26 LAB
APPEARANCE UR: CLEAR
BILIRUB UR STRIP-MCNC: NEGATIVE MG/DL
COLOR UR AUTO: YELLOW
CREAT UR-MCNC: 101.06 MG/DL
GLUCOSE UR STRIP.AUTO-MCNC: NEGATIVE MG/DL
HBA1C MFR BLD: 6.4 % (ref 0–5.6)
INT CON NEG: NEGATIVE
INT CON POS: POSITIVE
KETONES UR STRIP.AUTO-MCNC: NEGATIVE MG/DL
LEUKOCYTE ESTERASE UR QL STRIP.AUTO: NORMAL
MICROALBUMIN UR-MCNC: 7.6 MG/DL
MICROALBUMIN/CREAT UR: 75 MG/G (ref 0–30)
NITRITE UR QL STRIP.AUTO: NEGATIVE
PH UR STRIP.AUTO: 5.5 [PH] (ref 5–8)
PROT UR QL STRIP: NEGATIVE MG/DL
RBC UR QL AUTO: NORMAL
SP GR UR STRIP.AUTO: 1.03
UROBILINOGEN UR STRIP-MCNC: 0.2 MG/DL

## 2022-10-26 PROCEDURE — 82043 UR ALBUMIN QUANTITATIVE: CPT

## 2022-10-26 PROCEDURE — 81002 URINALYSIS NONAUTO W/O SCOPE: CPT | Performed by: NURSE PRACTITIONER

## 2022-10-26 PROCEDURE — 99214 OFFICE O/P EST MOD 30 MIN: CPT | Mod: 25 | Performed by: NURSE PRACTITIONER

## 2022-10-26 PROCEDURE — 90715 TDAP VACCINE 7 YRS/> IM: CPT | Performed by: NURSE PRACTITIONER

## 2022-10-26 PROCEDURE — 83036 HEMOGLOBIN GLYCOSYLATED A1C: CPT | Performed by: NURSE PRACTITIONER

## 2022-10-26 PROCEDURE — 90471 IMMUNIZATION ADMIN: CPT | Performed by: NURSE PRACTITIONER

## 2022-10-26 PROCEDURE — 82570 ASSAY OF URINE CREATININE: CPT

## 2022-10-26 RX ORDER — LOSARTAN POTASSIUM AND HYDROCHLOROTHIAZIDE 25; 100 MG/1; MG/1
1 TABLET ORAL DAILY
Qty: 90 TABLET | Refills: 1 | Status: SHIPPED | OUTPATIENT
Start: 2022-10-26

## 2022-10-26 RX ORDER — CARVEDILOL 12.5 MG/1
12.5 TABLET ORAL 2 TIMES DAILY WITH MEALS
Qty: 180 TABLET | Refills: 1 | Status: SHIPPED | OUTPATIENT
Start: 2022-10-26

## 2022-10-26 RX ORDER — CARVEDILOL 12.5 MG/1
12.5 TABLET ORAL 2 TIMES DAILY WITH MEALS
Qty: 90 TABLET | Refills: 1 | Status: SHIPPED | OUTPATIENT
Start: 2022-10-26 | End: 2022-10-26

## 2022-10-26 RX ORDER — ATORVASTATIN CALCIUM 40 MG/1
40 TABLET, FILM COATED ORAL
Qty: 90 TABLET | Refills: 3 | Status: SHIPPED | OUTPATIENT
Start: 2022-10-26

## 2022-10-26 ASSESSMENT — FIBROSIS 4 INDEX: FIB4 SCORE: 1

## 2022-10-26 ASSESSMENT — PATIENT HEALTH QUESTIONNAIRE - PHQ9: CLINICAL INTERPRETATION OF PHQ2 SCORE: 0

## 2022-10-26 NOTE — ASSESSMENT & PLAN NOTE
Latest Reference Range & Units 4/30/21 15:04   Glycohemoglobin 4.0 - 5.6 % 6.6 (H)   (H): Data is abnormally high  Metformin 500 mg twice daily and tolerating well.  Denies hypoglycemic episodes, denies neuropathy.    A1c today is 6.4.

## 2022-10-26 NOTE — ASSESSMENT & PLAN NOTE
New problem.  Pain started in the left flank and radiating to the left pelvic region as well as her back.  She has no LUTs, no fever, no dysuria, no blood in urine.  No saddle paresthesia. This has been going on x 2.5 wks, comes and goes.  Its a dull pain in quality, exacerbated with certain positions.  Ibuprofen improves the pain.

## 2022-10-26 NOTE — ASSESSMENT & PLAN NOTE
Chronic and stable.  On carvedilol 12.5 mg twice daily, Hyzaar 100-25 mg.  BP today is 122/70 with a pulse of 71

## 2022-10-26 NOTE — ASSESSMENT & PLAN NOTE
Latest Reference Range & Units 4/30/21 15:04   Cholesterol,Tot 100 - 199 mg/dL 195   Triglycerides 0 - 149 mg/dL 426 (H)   HDL >=40 mg/dL 36 !   LDL <100 mg/dL see below   (H): Data is abnormally high  !: Data is abnormal  Patient was placed on atorvastatin 40 mg, she did not follow-up after her appointment. She is not taking her medication.

## 2022-10-26 NOTE — PROGRESS NOTES
Chief Complaint   Patient presents with    Pain     L side, hurts when sitting and has been affecting her back, x 2-3 wks ago        HISTORY OF PRESENT ILLNESS: Patient is a 62 y.o. female, established patient who presents today to discuss medical problems as listed below:    Health Maintenance:  COMPLETED     Type 2 diabetes mellitus without complication   Latest Reference Range & Units 4/30/21 15:04   Glycohemoglobin 4.0 - 5.6 % 6.6 (H)   (H): Data is abnormally high  Metformin 500 mg twice daily and tolerating well.  Denies hypoglycemic episodes, denies neuropathy.    A1c today is 6.4.    Hyperlipidemia   Latest Reference Range & Units 4/30/21 15:04   Cholesterol,Tot 100 - 199 mg/dL 195   Triglycerides 0 - 149 mg/dL 426 (H)   HDL >=40 mg/dL 36 !   LDL <100 mg/dL see below   (H): Data is abnormally high  !: Data is abnormal  Patient was placed on atorvastatin 40 mg, she did not follow-up after her appointment. She is not taking her medication.       Flank pain  New problem.  Pain started in the left flank and radiating to the left pelvic region as well as her back.  She has no LUTs, no fever, no dysuria, no blood in urine.  No saddle paresthesia. This has been going on x 2.5 wks, comes and goes.  Its a dull pain in quality, exacerbated with certain positions.  Ibuprofen improves the pain.    Hypertension  Chronic and stable.  On carvedilol 12.5 mg twice daily, Hyzaar 100-25 mg.  BP today is 122/70 with a pulse of 71    Patient Active Problem List    Diagnosis Date Noted    Flank pain 10/26/2022    Hospital discharge follow-up 08/16/2016    Other chronic pain 07/15/2015    Abnormal lung sounds 04/06/2015    Hypertension 10/01/2014    Type 2 diabetes mellitus without complication (HCC) 10/01/2014    Anxiety and depression 10/01/2014    Hyperlipidemia 10/01/2014    Microalbuminuria 10/01/2014    Tobacco use 10/01/2014        Allergies: Nkda [no known drug allergy]    Current Outpatient Medications   Medication Sig  Dispense Refill    atorvastatin (LIPITOR) 40 MG Tab Take 1 Tablet by mouth at bedtime. 90 Tablet 3    losartan-hydrochlorothiazide (HYZAAR) 100-25 MG per tablet Take 1 Tablet by mouth every day. 90 Tablet 1    metFORMIN (GLUCOPHAGE) 500 MG Tab Take 1 Tablet by mouth 2 times a day with meals. 180 Tablet 1    carvedilol (COREG) 12.5 MG Tab Take 1 Tablet by mouth 2 times a day with meals. 180 Tablet 1    venlafaxine (EFFEXOR-XR) 150 MG extended-release capsule Take 150 mg by mouth every day.      buprenorphine-naloxone (SUBOXONE) 8-2 mg SL Tab SL Place 1 tablet under the tongue every day.      Ascorbic Acid (VITAMIN C) 1000 MG Tab Take  by mouth.      ASPIRIN 81 PO Take  by mouth.      cyanocobalamin (VITAMIN B-12) 100 MCG Tab Take 100 mcg by mouth every day.      Calcium-Magnesium-Vitamin D 185- MG-MG-UNIT Cap Take  by mouth.      Omega-3 Fatty Acids (FISH OIL) 1000 MG Cap capsule Take 1,000 mg by mouth 3 times a day with meals.      alprazolam (XANAX) 0.5 MG Tab TAKE ONE TABLET BY MOUTH DAILY AS NEEDED FOR ANXIETY 30 Tab 2     No current facility-administered medications for this visit.       Social History     Tobacco Use    Smoking status: Some Days     Packs/day: 0.10     Years: 30.00     Pack years: 3.00     Types: Cigarettes    Smokeless tobacco: Never    Tobacco comments:     1/2 pack a day   Vaping Use    Vaping Use: Never used   Substance Use Topics    Alcohol use: No     Alcohol/week: 0.0 oz    Drug use: No     Social History     Social History Narrative    Not on file       Family History   Problem Relation Age of Onset    Cancer Father         colon ca    Hyperlipidemia Father        Allergies, past medical history, past surgical history, family history, social history reviewed and updated.    Review of Systems:     - Constitutional: Negative for fever, chills, unexpected weight change, and fatigue/generalized weakness.     - Respiratory: Negative for cough, sputum production, chest congestion,  dyspnea, wheezing, and crackles.      - Cardiovascular: Negative for chest pain, palpitations, orthopnea, and bilateral lower extremity edema.      - Genitourinary: Negative for dysuria, polyuria, hematuria, pyuria, urinary urgency, and urinary incontinence.    - Musculoskeletal: Left lower back and left pelvic pain.  Negative for myalgias, and joint pain.     - Psychiatric/Behavioral: Negative for depression, suicidal/homicidal ideation and memory loss.      All other systems reviewed and are negative    Exam:    /70   Pulse 71   Temp 36.9 °C (98.4 °F) (Temporal)   Resp 16   Ht 1.524 m (5')   Wt 66.4 kg (146 lb 6.4 oz)   SpO2 93%   BMI 28.59 kg/m²  Body mass index is 28.59 kg/m².    Physical Exam:  Constitutional: Well-developed and well-nourished. Not diaphoretic. No distress.   Cardiovascular: Regular rate and rhythm, S1 and S2 without murmur, rubs, or gallops.    Chest: Effort normal. Clear to auscultation throughout. No adventitious sounds. No CVA tenderness.  Abdomen: Soft, non tender, and without distention. Active bowel sounds in all four quadrants. No rebound, guarding, masses or HSM.  : Negative for dysuria, polyuria, hematuria, pyuria, urinary urgency, and urinary incontinence.  Musculoskeletal: Unable to induce pain with ROM.  All major joints AROM full in all directions without pain.  Neurological: Alert and oriented x 3.   Psychiatric:  Behavior, mood, and affect are appropriate.  MA/nursing note and vitals reviewed.    Assessment/Plan:  1. Need for hepatitis C screening test  - HCV Scrn ( [id number] 1xLife); Future    2. Type 2 diabetes mellitus with microalbuminuria, without long-term current use of insulin (HCC)  Stable on current regimen.  Continue.  Refills given   - POCT Retinal Eye Exam  - POCT A1C  - Microalbumin Creat Ratio Urine (Clinic Collect); Future  - Diabetic Monofilament LE Exam  - HEMOGLOBIN A1C; Future  - TSH WITH REFLEX TO FT4; Future    3. Encounter for screening  mammogram for breast cancer  - MA-SCREENING MAMMO BILAT W/TOMOSYNTHESIS W/CAD; Future    4. Need for vaccination  - Tdap Vaccine =>8YO IM    5. Primary hypertension  Stable on current regimen.  Continue.  Refills given   - Comp Metabolic Panel; Future  - CBC WITHOUT DIFFERENTIAL; Future    6. Hyperlipidemia, unspecified hyperlipidemia type  Stable on current regimen.  Continue.  Refills given  - Lipid Profile; Future  - atorvastatin (LIPITOR) 40 MG Tab; Take 1 Tablet by mouth at bedtime.  Dispense: 90 Tablet; Refill: 3    7. Vitamin D deficiency  - VITAMIN D,25 HYDROXY (DEFICIENCY); Future    8. Type 2 diabetes mellitus without complication, without long-term current use of insulin (HCC)  - Referral to Ophthalmology    9. Flank pain  DDx includes but not limited to sacroiliac joint strain, microfracture. Will obtain imaging and follow up in 2 wks  - DX-LUMBAR SPINE-2 OR 3 VIEWS; Future  - POCT Urinalysis  - DX-HIP-BILATERAL-WITH PELVIS-2 VIEWS; Future    10. Essential hypertension  Stable on current regimen.  Continue.  Refills given   - losartan-hydrochlorothiazide (HYZAAR) 100-25 MG per tablet; Take 1 Tablet by mouth every day.  Dispense: 90 Tablet; Refill: 1  - carvedilol (COREG) 12.5 MG Tab; Take 1 Tablet by mouth 2 times a day with meals.  Dispense: 180 Tablet; Refill: 1       Discussed with patient possible alternative diagnoses, patient is to take all medications as prescribed.      If symptoms persist FU w/PCP, if symptoms worsen go to emergency room.      If experiencing any side effects from prescribed medications report to the office immediately or go to emergency room.     Reviewed indication, dosage, usage and potential adverse effects of prescribed medications.      Reviewed risks and benefits of treatment plan. Patient verbalizes understanding of all instruction and verbally agrees to plan.     Discussed plan with the patient, and patient agrees to the above.      I personally reviewed prior external  notes and test results pertinent to today's visit.      No follow-ups on file. 3 mos for annul, 2 wks for pap smear

## 2022-10-28 ENCOUNTER — NON-PROVIDER VISIT (OUTPATIENT)
Dept: URGENT CARE | Facility: CLINIC | Age: 62
End: 2022-10-28
Payer: COMMERCIAL

## 2022-10-28 ENCOUNTER — APPOINTMENT (OUTPATIENT)
Dept: RADIOLOGY | Facility: IMAGING CENTER | Age: 62
End: 2022-10-28
Attending: NURSE PRACTITIONER
Payer: COMMERCIAL

## 2022-10-28 DIAGNOSIS — R10.9 FLANK PAIN: ICD-10-CM

## 2022-10-28 PROCEDURE — 73521 X-RAY EXAM HIPS BI 2 VIEWS: CPT | Mod: TC | Performed by: NURSE PRACTITIONER

## 2022-10-28 PROCEDURE — 72100 X-RAY EXAM L-S SPINE 2/3 VWS: CPT | Mod: TC | Performed by: RADIOLOGY

## 2022-11-18 DIAGNOSIS — G89.29 OTHER CHRONIC PAIN: ICD-10-CM

## 2022-11-18 DIAGNOSIS — F41.9 ANXIETY AND DEPRESSION: ICD-10-CM

## 2022-11-18 DIAGNOSIS — F32.A ANXIETY AND DEPRESSION: ICD-10-CM

## 2022-11-18 RX ORDER — VENLAFAXINE HYDROCHLORIDE 150 MG/1
150 CAPSULE, EXTENDED RELEASE ORAL DAILY
Qty: 90 CAPSULE | Refills: 3 | OUTPATIENT
Start: 2022-11-18

## 2022-12-06 RX ORDER — VENLAFAXINE HYDROCHLORIDE 150 MG/1
150 CAPSULE, EXTENDED RELEASE ORAL DAILY
Qty: 90 CAPSULE | Refills: 0 | Status: SHIPPED | OUTPATIENT
Start: 2022-12-06 | End: 2023-04-21

## 2022-12-06 NOTE — TELEPHONE ENCOUNTER
[facility]. Pt was left a detailed vm.

## 2023-07-13 ENCOUNTER — OFFICE VISIT (OUTPATIENT)
Dept: URGENT CARE | Facility: CLINIC | Age: 63
End: 2023-07-13
Payer: COMMERCIAL

## 2023-07-13 ENCOUNTER — APPOINTMENT (OUTPATIENT)
Dept: RADIOLOGY | Facility: IMAGING CENTER | Age: 63
End: 2023-07-13
Attending: STUDENT IN AN ORGANIZED HEALTH CARE EDUCATION/TRAINING PROGRAM
Payer: COMMERCIAL

## 2023-07-13 VITALS
OXYGEN SATURATION: 93 % | DIASTOLIC BLOOD PRESSURE: 84 MMHG | BODY MASS INDEX: 31.8 KG/M2 | TEMPERATURE: 97.2 F | RESPIRATION RATE: 14 BRPM | HEIGHT: 60 IN | WEIGHT: 162 LBS | HEART RATE: 78 BPM | SYSTOLIC BLOOD PRESSURE: 136 MMHG

## 2023-07-13 DIAGNOSIS — R07.81 RIB PAIN ON RIGHT SIDE: ICD-10-CM

## 2023-07-13 PROCEDURE — 71101 X-RAY EXAM UNILAT RIBS/CHEST: CPT | Mod: TC,RT | Performed by: STUDENT IN AN ORGANIZED HEALTH CARE EDUCATION/TRAINING PROGRAM

## 2023-07-13 PROCEDURE — 3079F DIAST BP 80-89 MM HG: CPT | Performed by: STUDENT IN AN ORGANIZED HEALTH CARE EDUCATION/TRAINING PROGRAM

## 2023-07-13 PROCEDURE — 3075F SYST BP GE 130 - 139MM HG: CPT | Performed by: STUDENT IN AN ORGANIZED HEALTH CARE EDUCATION/TRAINING PROGRAM

## 2023-07-13 PROCEDURE — 99213 OFFICE O/P EST LOW 20 MIN: CPT | Performed by: STUDENT IN AN ORGANIZED HEALTH CARE EDUCATION/TRAINING PROGRAM

## 2023-07-13 RX ORDER — IBUPROFEN 800 MG/1
800 TABLET ORAL EVERY 8 HOURS PRN
Qty: 30 TABLET | Refills: 0 | Status: SHIPPED | OUTPATIENT
Start: 2023-07-13

## 2023-07-13 ASSESSMENT — FIBROSIS 4 INDEX: FIB4 SCORE: 1.16

## 2023-07-13 NOTE — LETTER
July 13, 2023    To Whom It May Concern:         This is confirmation that Milka Storm attended her scheduled appointment with Jason Ford P.A.-C. on 7/13/23.  Advised patient that I do believe her  buying her a front loading washing machine would be beneficial at this time to avoid any additional injuries.         If you have any questions please do not hesitate to call me at the phone number listed below.    Sincerely,          Jason Ford P.A.-C.  [phone]

## 2023-07-13 NOTE — PROGRESS NOTES
Subjective:   Milka Storm is a 63 y.o. female who presents for Rib Injury (R side, heard a pop after reaching over washer machine, difficult to breathe, tenderness x yesterday )      HPI:  Pleasant 63-year-old female presents to the urgent care for an injury to her right ribs that occurred yesterday.  She states that she was leaning over her washing machine and scraped the right side of her ribs on the machine.  During that she heard a pop.  She has been having right sided rib pain to the anterolateral aspect of her rib cage for the past 24 hours.  Reports that the pain is worse with movements and taking deep breaths.  Other than the pain with breathing she does not feel short of breath.  No fever, chills, nausea, vomiting, dizziness, headache, chest pain, palpitations, hemoptysis, or wheezing.  Denies any other trauma.      Medications:    ALPRAZolam Tabs  ASPIRIN 81 PO  atorvastatin Tabs  Buprenorphine HCl-Naloxone HCl Film  buprenorphine-naloxone Subl  Calcium-Magnesium-Vitamin D Caps  carvedilol Tabs  cyanocobalamin Tabs  fish oil Caps  losartan-hydrochlorothiazide  metFORMIN Tabs  venlafaxine  Vitamin C Tabs    Allergies: Nkda [no known drug allergy]    Problem List: Milka Storm does not have any pertinent problems on file.    Surgical History:  Past Surgical History:   Procedure Laterality Date    CHOLECYSTECTOMY      GYN SURGERY      tubal ligation    OTHER ORTHOPEDIC SURGERY      left shoulder surgery       Past Social Hx: Milka Storm  reports that she has been smoking cigarettes. She has a 3.00 pack-year smoking history. She has never used smokeless tobacco. She reports that she does not drink alcohol and does not use drugs.     Past Family Hx:  Milka Storm family history includes Cancer in her father; Hyperlipidemia in her father.     Problem list, medications, and allergies reviewed by myself today in Epic.     Objective:     /84 (BP Location: Left arm, Patient Position: Sitting, BP Cuff Size:  Adult)   Pulse 78   Temp 36.2 °C (97.2 °F) (Temporal)   Resp 14   Ht 1.524 m (5')   Wt 73.5 kg (162 lb)   SpO2 93%   BMI 31.64 kg/m²     Physical Exam  Vitals reviewed.   Constitutional:       General: She is not in acute distress.     Appearance: Normal appearance.   HENT:      Head: Normocephalic.   Eyes:      Conjunctiva/sclera: Conjunctivae normal.      Pupils: Pupils are equal, round, and reactive to light.   Cardiovascular:      Rate and Rhythm: Normal rate and regular rhythm.      Pulses: Normal pulses.      Heart sounds: Normal heart sounds. No murmur heard.  Pulmonary:      Effort: Pulmonary effort is normal. No respiratory distress.      Breath sounds: Normal breath sounds. No stridor. No wheezing, rhonchi or rales.      Comments: Patient does have some mildly decreased breath sounds bilaterally but no diminished breath sounds isolated to one side.  Chest:          Comments: Right rib cage: TTP present to the anterolateral aspect of approximately rib 4 and 5.  No crepitus or step-offs.  No instability.  No ecchymosis or vesicles.  Neurological:      Mental Status: She is alert.       RADIOLOGY RESULTS   [id number] (WITH 1-VIEW CXR) RIGHT    Result Date: 7/13/2023 7/13/2023 1:22 PM HISTORY/REASON FOR EXAM:  Pain Following Trauma; Patient was bending over her washing machine 24 hours ago when she heard a pop in her right rib cage.  Pain to the anterolateral aspect of rib 4 and 5.  No crepitus.  Does report pain with deep breathing.  Normal breath sounds.. Right chest pain after injury TECHNIQUE/EXAM DESCRIPTION AND NUMBER OF VIEWS:  5 images of the right ribs and chest. COMPARISON: NONE FINDINGS: Cardiomediastinal silhouette is normal. No focal consolidation, pleural effusion, pulmonary edema or pneumothorax. No displaced rib fracture is seen. Nondisplaced rib fractures could be present and not visualized.     No acute cardiopulmonary abnormality. No displaced right rib fracture.             Assessment/Plan:     Diagnosis and associated orders:     1. Rib pain on right side  [id number] (WITH 1-VIEW CXR) RIGHT    ibuprofen (MOTRIN) 800 MG Tab         Comments/MDM:     X-ray shows no acute cardiopulmonary abnormality.  No displaced rib fractures.  Reviewed imaging myself and I agree with this report.  Discussed imaging with the patient.  Advised that there is always a possibility of a hairline fracture that is not seen on this current imaging.  Discussed that her symptoms are most likely due to soft tissue injury/contusion.  This should be self-limited and resolve within the next few weeks.  She would like a refill of her ibuprofen 800 mg which I did send over to the pharmacy.  Advised to use this as infrequent as possible.  May use Tylenol as needed.  Deep breathing exercises 3-5 times daily.  She is well-appearing and nontoxic.  Vitals are stable.  Strict ED/return precautions given including new onset shortness of breath, worsening pain, lightheadedness, dizziness, or chest pain.         Differential diagnosis, natural history, supportive care, and indications for immediate follow-up discussed.    Advised the patient to follow-up with the primary care physician for recheck, reevaluation, and consideration of further management.    Please note that this dictation was created using voice recognition software. I have made a reasonable attempt to correct obvious errors, but I expect that there are errors of grammar and possibly content that I did not discover before finalizing the note.    Electronically signed by Jason Ford PA-C.

## 2024-02-03 NOTE — TELEPHONE ENCOUNTER
Shayy Jarquin,     Your provider Samaria Jansen indicated during your last visit they would like to see you for diabetes management every 6 months. It is very important to get this appointment scheduled for so can have your A1C checked and make certain we are doing all we can to keep your diabetes well controlled which adds to your longevity.     Please call us at [phone], to schedule your appointment.     Sincerely,   Your Healthcare Team  
2 (mild pain)